# Patient Record
Sex: MALE | Race: WHITE | NOT HISPANIC OR LATINO | URBAN - METROPOLITAN AREA
[De-identification: names, ages, dates, MRNs, and addresses within clinical notes are randomized per-mention and may not be internally consistent; named-entity substitution may affect disease eponyms.]

---

## 2019-07-02 ENCOUNTER — TELEPHONE (OUTPATIENT)
Dept: CARDIOLOGY CLINIC | Facility: CLINIC | Age: 54
End: 2019-07-02

## 2019-07-02 NOTE — TELEPHONE ENCOUNTER
Phone call from Jaimee Willis from [employer]  stating that Jodee's life vest was approved  He is a hospital patient